# Patient Record
Sex: MALE | Race: WHITE | NOT HISPANIC OR LATINO | Employment: FULL TIME | URBAN - METROPOLITAN AREA
[De-identification: names, ages, dates, MRNs, and addresses within clinical notes are randomized per-mention and may not be internally consistent; named-entity substitution may affect disease eponyms.]

---

## 2020-09-17 ENCOUNTER — APPOINTMENT (OUTPATIENT)
Dept: GENERAL RADIOLOGY | Facility: HOSPITAL | Age: 35
End: 2020-09-17

## 2020-09-17 ENCOUNTER — HOSPITAL ENCOUNTER (EMERGENCY)
Facility: HOSPITAL | Age: 35
Discharge: HOME | End: 2020-09-17
Admitting: EMERGENCY MEDICINE

## 2020-09-17 VITALS
HEART RATE: 69 BPM | DIASTOLIC BLOOD PRESSURE: 75 MMHG | TEMPERATURE: 98 F | OXYGEN SATURATION: 100 % | SYSTOLIC BLOOD PRESSURE: 116 MMHG | RESPIRATION RATE: 16 BRPM | BODY MASS INDEX: 23.95 KG/M2 | WEIGHT: 176.81 LBS | HEIGHT: 72 IN

## 2020-09-17 DIAGNOSIS — S61.012A LACERATION OF LEFT THUMB WITHOUT FOREIGN BODY WITHOUT DAMAGE TO NAIL, INITIAL ENCOUNTER: Primary | ICD-10-CM

## 2020-09-17 DIAGNOSIS — S60.419A ABRASION OF FINGER, INITIAL ENCOUNTER: ICD-10-CM

## 2020-09-17 DIAGNOSIS — S61.325A: ICD-10-CM

## 2020-09-17 PROCEDURE — 25010000003 LIDOCAINE 1 % SOLUTION

## 2020-09-17 PROCEDURE — 73140 X-RAY EXAM OF FINGER(S): CPT

## 2020-09-17 PROCEDURE — 99283 EMERGENCY DEPT VISIT LOW MDM: CPT

## 2020-09-17 RX ORDER — CLINDAMYCIN HYDROCHLORIDE 300 MG/1
300 CAPSULE ORAL 3 TIMES DAILY
Qty: 15 CAPSULE | Refills: 0 | Status: SHIPPED | OUTPATIENT
Start: 2020-09-17 | End: 2020-09-22

## 2020-09-17 RX ORDER — IBUPROFEN 800 MG/1
800 TABLET ORAL EVERY 8 HOURS PRN
Qty: 9 TABLET | Refills: 0 | Status: SHIPPED | OUTPATIENT
Start: 2020-09-17

## 2020-09-17 RX ORDER — HYDROCODONE BITARTRATE AND ACETAMINOPHEN 5; 325 MG/1; MG/1
1 TABLET ORAL ONCE AS NEEDED
Status: DISCONTINUED | OUTPATIENT
Start: 2020-09-17 | End: 2020-09-17 | Stop reason: HOSPADM

## 2020-09-17 RX ADMIN — HYDROCODONE BITARTRATE AND ACETAMINOPHEN 1 TABLET: 5; 325 TABLET ORAL at 20:02

## 2020-09-17 NOTE — ED PROVIDER NOTES
Subjective   Chief complaint: finger laceration      Context: Patient is a 35-year-old male who comes in complaining of finger lacerations to his left hand.  He has a minor laceration noted to the left thumb and fifth finger and states the  mostly hit the fourth finger.  He reports no medical problems is unsure when his last tetanus shot was.  He denies any numbness or tingling and states it is moving normally without any weakness.    Duration: 30 min PTA    Timing: waxes and wanes    Severity: moderate    Associated symptoms: worse with ROM          PCP:             Review of Systems   Constitutional: Negative for fever.   HENT: Negative.    Respiratory: Negative.    Genitourinary: Negative.    Musculoskeletal: Positive for myalgias.   Skin: Positive for wound.   Allergic/Immunologic: Negative for immunocompromised state.   Neurological: Negative for numbness.   Hematological: Does not bruise/bleed easily.   Psychiatric/Behavioral: Negative for confusion.       No past medical history on file.    Allergies   Allergen Reactions   • Ceclor [Cefaclor] Hives   • Sulfa Antibiotics Hives       No past surgical history on file.    No family history on file.    Social History     Socioeconomic History   • Marital status:      Spouse name: Not on file   • Number of children: Not on file   • Years of education: Not on file   • Highest education level: Not on file           Objective   Physical Exam     Vital signs and traige nurse note reviewed.  Constitutional:  Awake, alert; well developed and well nourished.  No acute distress,  HEENT:  Normocephalic, atraumatic;  with intact EOM; oropharynx is pink and moist without edema or erythema.  Neck: Supple, full range of motion without pain;   Cardiovascular: Regular rate and rhythm,    Pulmonary: Respiratory effort regular, nonlabored;   Musculoskeletal: Patient's hands are blackened bilaterally from work.  There is a superficial nonbleeding 1 cm laceration noted  to the dorsum of the left thumb distal phalanx without nailbed injury.  There is a superficial laceration noted to the distal phalanx of the tip and palmar aspect of the left fifth finger, there is good FDS and FDP strength with flexion extension distal resting sensorimotor intact.  There is a approximate 2-1/2 cm laceration extending from the left fourth finger distal phalanx across the nailbed medially to the palmar aspect of the finger.  There is no underlying swelling with a scant amount of active bleeding.  Good strength with flexion extension.  Distal resting sensorimotor intact.  Neuro: Alert oriented x3, speech is clear and appropriate.      Laceration Repair    Date/Time: 9/17/2020 8:40 PM  Performed by: Bushra Galvez APRN  Authorized by: Bushra Galvez APRN     Consent:     Consent obtained:  Verbal    Consent given by:  Patient    Risks discussed:  Infection, need for additional repair, pain, retained foreign body, tendon damage, vascular damage, poor wound healing, poor cosmetic result and nerve damage    Alternatives discussed:  No treatment, delayed treatment, observation and referral  Anesthesia (see MAR for exact dosages):     Anesthesia method:  Nerve block    Block needle gauge:  30 G    Block anesthetic:  Bupivacaine 0.5% w/o epi and lidocaine 1% w/o epi    Block injection procedure:  Anatomic landmarks identified, introduced needle, incremental injection, negative aspiration for blood and anatomic landmarks palpated    Block outcome:  Anesthesia achieved  Laceration details:     Location:  Finger    Finger location:  L ring finger  Pre-procedure details:     Preparation:  Patient was prepped and draped in usual sterile fashion and imaging obtained to evaluate for foreign bodies  Exploration:     Wound exploration: wound explored through full range of motion and entire depth of wound probed and visualized      Wound extent: foreign bodies/material      Contaminated: yes    Treatment:      Area cleansed with:  Hibiclens and saline    Amount of cleaning:  Extensive    Irrigation solution:  Sterile saline    Visualized foreign bodies/material removed: yes    Skin repair:     Repair method:  Sutures    Suture size:  5-0    Suture material:  Nylon    Number of sutures:  3  Approximation:     Approximation:  Close  Post-procedure details:     Patient tolerance of procedure:  Tolerated well, no immediate complications  Comments:      Steri-Strips were applied to the left fifth finger abrasion.  No wound repair but cleansed of the fourth finger.  There was no wound repair to the laceration of the thumb               ED Course           Labs Reviewed - No data to display  Medications   Tdap (BOOSTRIX) injection 0.5 mL (0.5 mL Intramuscular Not Given 9/17/20 2003)   HYDROcodone-acetaminophen (NORCO) 5-325 MG per tablet 1 tablet (1 tablet Oral Given 9/17/20 2002)     Xr Finger 2+ View Left    Result Date: 9/17/2020  1.Evidence for soft tissue trauma involving the distal aspect of the fourth and fifth digits. There is evidence for multiple retained radiopaque foreign bodies within the areas of soft tissue laceration or trauma. 2.No displaced fracture or dislocation.  Electronically Signed By-Jesus Alberto Meneses On:9/17/2020 8:16 PM This report was finalized on [id number] by  Jesus Alberto Meneses, .                                    MDM  Number of Diagnoses or Management Options  Abrasion of finger, initial encounter:   Laceration of left ring finger with foreign body and damage to nail, initial encounter:   Laceration of left thumb without foreign body without damage to nail, initial encounter:   Diagnosis management comments:   Comorbidities:  has no past medical history on file.  Differentials: Fracture foreign body retention not all inclusive of differentials considered  Radiology interpretation:  X-rays reviewed by me and interpreted by radiologist,   Xr Finger 2+ View Left    Result Date: 9/17/2020  1.Evidence for  soft tissue trauma involving the distal aspect of the fourth and fifth digits. There is evidence for multiple retained radiopaque foreign bodies within the areas of soft tissue laceration or trauma. 2.No displaced fracture or dislocation.  Electronically Signed By-Jesus Alberto Meneses On:9/17/2020 8:16 PM This report was finalized on [id number] by  Jesus Alberto Meneses, .    Appropriate PPE worn during exam.    i discussed findings with patient who voices understanding of discharge instructions, signs and symptoms requiring return to ED; discharged improved and in stable condition with follow up for re-evaluation.      Patient was updated on tetanus and x-rays were obtained.  He was given a Norco.  We discussed the foreign bodies on his x-ray and importance of follow-up as he may need debridement or nailbed repair and he was agreeable and verbalized understanding.  He refused suture repair of the left fourth finger laceration between the DIP and PIP.      Final diagnoses:   Laceration of left thumb without foreign body without damage to nail, initial encounter   Laceration of left ring finger with foreign body and damage to nail, initial encounter   Abrasion of finger, initial encounter            Bushra Galvez, APRN  09/17/20 1827

## 2022-03-10 ENCOUNTER — TRANSCRIBE ORDERS (OUTPATIENT)
Dept: ADMINISTRATIVE | Facility: HOSPITAL | Age: 37
End: 2022-03-10

## 2022-03-10 ENCOUNTER — LAB (OUTPATIENT)
Dept: LAB | Facility: HOSPITAL | Age: 37
End: 2022-03-10

## 2022-03-10 DIAGNOSIS — E78.5 HYPERLIPIDEMIA, UNSPECIFIED HYPERLIPIDEMIA TYPE: ICD-10-CM

## 2022-03-10 DIAGNOSIS — E55.9 VITAMIN D DEFICIENCY: ICD-10-CM

## 2022-03-10 DIAGNOSIS — Z12.5 SPECIAL SCREENING FOR MALIGNANT NEOPLASM OF PROSTATE: ICD-10-CM

## 2022-03-10 DIAGNOSIS — E61.1 IRON DEFICIENCY: ICD-10-CM

## 2022-03-10 DIAGNOSIS — R21 SKIN RASH: ICD-10-CM

## 2022-03-10 DIAGNOSIS — E03.9 PRIMARY HYPOTHYROIDISM: ICD-10-CM

## 2022-03-10 DIAGNOSIS — F41.9 ANXIETY: ICD-10-CM

## 2022-03-10 DIAGNOSIS — R53.83 OTHER FATIGUE: ICD-10-CM

## 2022-03-10 DIAGNOSIS — R53.83 OTHER FATIGUE: Primary | ICD-10-CM

## 2022-03-10 LAB
25(OH)D3 SERPL-MCNC: 33.5 NG/ML (ref 30–100)
ALBUMIN SERPL-MCNC: 4.7 G/DL (ref 3.5–5.2)
ALBUMIN/GLOB SERPL: 1.6 G/DL
ALP SERPL-CCNC: 58 U/L (ref 39–117)
ALT SERPL W P-5'-P-CCNC: 35 U/L (ref 1–41)
ANION GAP SERPL CALCULATED.3IONS-SCNC: 7 MMOL/L (ref 5–15)
AST SERPL-CCNC: 26 U/L (ref 1–40)
BILIRUB SERPL-MCNC: 0.3 MG/DL (ref 0–1.2)
BUN SERPL-MCNC: 21 MG/DL (ref 6–20)
BUN/CREAT SERPL: 22.3 (ref 7–25)
CALCIUM SPEC-SCNC: 10 MG/DL (ref 8.6–10.5)
CHLORIDE SERPL-SCNC: 104 MMOL/L (ref 98–107)
CO2 SERPL-SCNC: 27 MMOL/L (ref 22–29)
CREAT SERPL-MCNC: 0.94 MG/DL (ref 0.76–1.27)
CRP SERPL-MCNC: 0.06 MG/DL (ref 0.01–0.5)
DEPRECATED RDW RBC AUTO: 39 FL (ref 37–54)
EGFRCR SERPLBLD CKD-EPI 2021: 109.1 ML/MIN/1.73
ERYTHROCYTE [DISTWIDTH] IN BLOOD BY AUTOMATED COUNT: 12.5 % (ref 12.3–15.4)
FERRITIN SERPL-MCNC: 260 NG/ML (ref 30–400)
GLOBULIN UR ELPH-MCNC: 2.9 GM/DL
GLUCOSE SERPL-MCNC: 105 MG/DL (ref 65–99)
HCT VFR BLD AUTO: 51.1 % (ref 37.5–51)
HGB BLD-MCNC: 17.3 G/DL (ref 13–17.7)
MCH RBC QN AUTO: 29.4 PG (ref 26.6–33)
MCHC RBC AUTO-ENTMCNC: 33.9 G/DL (ref 31.5–35.7)
MCV RBC AUTO: 86.8 FL (ref 79–97)
PLATELET # BLD AUTO: 195 10*3/MM3 (ref 140–450)
PMV BLD AUTO: 10.6 FL (ref 6–12)
POTASSIUM SERPL-SCNC: 4.7 MMOL/L (ref 3.5–5.2)
PROT SERPL-MCNC: 7.6 G/DL (ref 6–8.5)
PSA SERPL-MCNC: 0.77 NG/ML (ref 0–4)
RBC # BLD AUTO: 5.89 10*6/MM3 (ref 4.14–5.8)
SODIUM SERPL-SCNC: 138 MMOL/L (ref 136–145)
T3FREE SERPL-MCNC: 3.52 PG/ML (ref 2–4.4)
T4 FREE SERPL-MCNC: 1.3 NG/DL (ref 0.93–1.7)
TESTOST SERPL-MCNC: 384 NG/DL (ref 249–836)
TSH SERPL DL<=0.05 MIU/L-ACNC: 1.08 UIU/ML (ref 0.27–4.2)
WBC NRBC COR # BLD: 6.41 10*3/MM3 (ref 3.4–10.8)

## 2022-03-10 PROCEDURE — 84443 ASSAY THYROID STIM HORMONE: CPT

## 2022-03-10 PROCEDURE — 84481 FREE ASSAY (FT-3): CPT

## 2022-03-10 PROCEDURE — 86141 C-REACTIVE PROTEIN HS: CPT

## 2022-03-10 PROCEDURE — 84439 ASSAY OF FREE THYROXINE: CPT

## 2022-03-10 PROCEDURE — 80053 COMPREHEN METABOLIC PANEL: CPT

## 2022-03-10 PROCEDURE — G0103 PSA SCREENING: HCPCS

## 2022-03-10 PROCEDURE — 84403 ASSAY OF TOTAL TESTOSTERONE: CPT

## 2022-03-10 PROCEDURE — 85027 COMPLETE CBC AUTOMATED: CPT

## 2022-03-10 PROCEDURE — 83525 ASSAY OF INSULIN: CPT

## 2022-03-10 PROCEDURE — 82306 VITAMIN D 25 HYDROXY: CPT

## 2022-03-10 PROCEDURE — 86628 CANDIDA ANTIBODY: CPT

## 2022-03-10 PROCEDURE — 82728 ASSAY OF FERRITIN: CPT

## 2022-03-10 PROCEDURE — 36415 COLL VENOUS BLD VENIPUNCTURE: CPT

## 2022-03-11 LAB — INSULIN SERPL-ACNC: 10.2 UIU/ML (ref 2.6–24.9)

## 2024-12-22 NOTE — PROGRESS NOTES
Encounter Date:12/31/2024      Patient ID: Ernie Stuart is a 39 y.o. male.    Chief Complaint:  Chest discomfort  Incomplete right bundle branch block    History of Present Illness  The patient is a pleasant 39-year-old white male is here for evaluation of chest discomfort.  Patient was having symptoms of substernal discomfort sometimes with eating.  Symptoms have somewhat improved with taking medications.  EKG showed incomplete right bundle branch block.  The chest discomfort is described as substernal aching sensation.  Not necessarily exertional.  Nonradiating.    Patient is not having any shortness of breath, palpitations, dizziness or syncope.  Denies having any headache ,abdominal pain ,nausea, vomiting , diarrhea constipation, loss of weight or loss of appetite.  Denies having any excessive bruising ,hematuria or blood in the stool.    Review of all systems negative except as indicated.    Reviewed ROS.    Assessment and Plan       ]]]]]]]]]]]]]]]]]]]  Impression  =========  -Chest discomfort-atypical for angina pectoris.    - History of exertional palpitations    - Possible GERD    -Incomplete right bundle branch block-EKG from 12/5/2024    - 12 family history of coronary artery disease    - Allergic to Ceclor and sulfa    - Non-smoker        Chest discomfort-atypical.  EKG showed incomplete right bundle branch block.  Complaints could be from GERD.  Patient has exertional palpitations.    Patient has strong family history of coronary artery disease.  Stress Cardiolite test  Echocardiogram    Medications were reviewed and updated.    Follow-up in the office on the same day or soon after.    Further plan will depend on patient's progress.  [[[[[[[[[[[[[[[[[           Diagnosis Plan   1. Chest discomfort  Adult Transthoracic Echo Complete W/ Cont if Necessary Per Protocol    Stress Test With Myocardial Perfusion One Day      2. Family history of coronary artery disease  Adult Transthoracic Echo Complete W/  Cont if Necessary Per Protocol    Stress Test With Myocardial Perfusion One Day      3. Incomplete right bundle branch block  Adult Transthoracic Echo Complete W/ Cont if Necessary Per Protocol    Stress Test With Myocardial Perfusion One Day      4. Chest pain due to GERD  Adult Transthoracic Echo Complete W/ Cont if Necessary Per Protocol    Stress Test With Myocardial Perfusion One Day      LAB RESULTS (LAST 7 DAYS)    CBC        BMP        CMP         BNP        TROPONIN        CoAg        Creatinine Clearance  CrCl cannot be calculated (Patient's most recent lab result is older than the maximum 30 days allowed.).    ABG        Radiology  No radiology results for the last day                The following portions of the patient's history were reviewed and updated as appropriate: allergies, current medications, past family history, past medical history, past social history, past surgical history, and problem list.    Review of Systems   Constitutional: Negative for malaise/fatigue.   Cardiovascular:  Positive for palpitations. Negative for chest pain, dyspnea on exertion and leg swelling.   Respiratory:  Negative for cough and shortness of breath.    Gastrointestinal:  Negative for abdominal pain, nausea and vomiting.   Neurological:  Negative for dizziness, focal weakness, headaches, light-headedness and numbness.   All other systems reviewed and are negative.        Current Outpatient Medications:     ibuprofen (ADVIL,MOTRIN) 800 MG tablet, Take 1 tablet by mouth Every 8 (Eight) Hours As Needed for Moderate Pain . (Patient not taking: Reported on 12/31/2024), Disp: 9 tablet, Rfl: 0    Allergies   Allergen Reactions    Ceclor [Cefaclor] Hives    Sulfa Antibiotics Hives       Family History   Problem Relation Age of Onset    Heart disease Father     Diabetes Maternal Grandmother     Brain cancer Maternal Grandfather     Heart disease Paternal Grandfather        History reviewed. No pertinent surgical  "history.    Past Medical History:   Diagnosis Date    Abnormal ECG        Family History   Problem Relation Age of Onset    Heart disease Father     Diabetes Maternal Grandmother     Brain cancer Maternal Grandfather     Heart disease Paternal Grandfather        Social History     Socioeconomic History    Marital status:    Tobacco Use    Smoking status: Never     Passive exposure: Never    Smokeless tobacco: Never   Vaping Use    Vaping status: Never Used   Substance and Sexual Activity    Alcohol use: Defer    Drug use: Never    Sexual activity: Defer         Procedures      Objective:       Physical Exam    /74 (BP Location: Right arm, Patient Position: Sitting, Cuff Size: Adult)   Pulse 72   Ht 182.9 cm (72\")   Wt 87.1 kg (192 lb)   SpO2 97%   BMI 26.04 kg/m²   The patient is alert, oriented and in no distress.    Vital signs as noted above.    Head and neck revealed no carotid bruits or jugular venous distension.  No thyromegaly or lymphadenopathy is present.    Lungs clear.  No wheezing.  Breath sounds are normal bilaterally.    Heart normal first and second heart sounds.  No murmur..  No pericardial rub is present.  No gallop is present.    Abdomen soft and nontender.  No organomegaly is present.    Extremities revealed good peripheral pulses without any pedal edema.    Skin warm and dry.    Musculoskeletal system is grossly normal.    CNS grossly normal.    Reviewed and updated.          "

## 2024-12-31 ENCOUNTER — OFFICE VISIT (OUTPATIENT)
Dept: CARDIOLOGY | Facility: CLINIC | Age: 39
End: 2024-12-31
Payer: COMMERCIAL

## 2024-12-31 VITALS
BODY MASS INDEX: 26.01 KG/M2 | SYSTOLIC BLOOD PRESSURE: 130 MMHG | OXYGEN SATURATION: 97 % | HEART RATE: 72 BPM | DIASTOLIC BLOOD PRESSURE: 74 MMHG | WEIGHT: 192 LBS | HEIGHT: 72 IN

## 2024-12-31 DIAGNOSIS — K21.9 CHEST PAIN DUE TO GERD: ICD-10-CM

## 2024-12-31 DIAGNOSIS — R07.9 CHEST PAIN DUE TO GERD: ICD-10-CM

## 2024-12-31 DIAGNOSIS — R07.89 CHEST DISCOMFORT: Primary | ICD-10-CM

## 2024-12-31 DIAGNOSIS — Z82.49 FAMILY HISTORY OF CORONARY ARTERY DISEASE: ICD-10-CM

## 2024-12-31 DIAGNOSIS — I45.10 INCOMPLETE RIGHT BUNDLE BRANCH BLOCK: ICD-10-CM

## 2024-12-31 PROCEDURE — 99204 OFFICE O/P NEW MOD 45 MIN: CPT | Performed by: INTERNAL MEDICINE

## 2025-01-16 ENCOUNTER — TELEPHONE (OUTPATIENT)
Dept: CARDIOLOGY | Facility: CLINIC | Age: 40
End: 2025-01-16
Payer: COMMERCIAL

## 2025-01-16 NOTE — TELEPHONE ENCOUNTER
Caller: Ernie Stuart    Relationship: Self    Best call back number: [phone]      PATIENT IS NEEDING TO CANCEL ECHO AND STRESS TEST FOR NOW.

## 2025-03-07 ENCOUNTER — HOSPITAL ENCOUNTER (EMERGENCY)
Facility: HOSPITAL | Age: 40
Discharge: HOME | End: 2025-03-08
Attending: EMERGENCY MEDICINE
Payer: COMMERCIAL

## 2025-03-07 DIAGNOSIS — T15.91XA FOREIGN BODY OF RIGHT EYE, INITIAL ENCOUNTER: Primary | ICD-10-CM

## 2025-03-07 PROCEDURE — 99283 EMERGENCY DEPT VISIT LOW MDM: CPT

## 2025-03-07 PROCEDURE — 99282 EMERGENCY DEPT VISIT SF MDM: CPT

## 2025-03-07 RX ORDER — BENOXINATE HCL/FLUORESCEIN SOD 0.4%-0.25%
1 DROPS OPHTHALMIC (EYE) ONCE
Status: COMPLETED | OUTPATIENT
Start: 2025-03-07 | End: 2025-03-07

## 2025-03-07 RX ADMIN — FLUORESCEIN SODIUM AND BENOXINATE HYDROCHLORIDE 1 DROP: 2.5; 4 SOLUTION OPHTHALMIC at 23:51

## 2025-03-08 VITALS
HEART RATE: 65 BPM | OXYGEN SATURATION: 100 % | RESPIRATION RATE: 17 BRPM | DIASTOLIC BLOOD PRESSURE: 80 MMHG | WEIGHT: 189.38 LBS | BODY MASS INDEX: 25.65 KG/M2 | SYSTOLIC BLOOD PRESSURE: 132 MMHG | HEIGHT: 72 IN | TEMPERATURE: 97.9 F

## 2025-03-08 RX ORDER — ERYTHROMYCIN 5 MG/G
OINTMENT OPHTHALMIC
Qty: 3.5 G | Refills: 0 | Status: SHIPPED | OUTPATIENT
Start: 2025-03-08 | End: 2025-03-13

## 2025-03-08 NOTE — ED PROVIDER NOTES
Subjective   History of Present Illness  38y/o M Patient presents with irritation in the eye after feeling like something got into it while working yesterday.  Was working with metal. Initially, the irritation was mild, but it has worsened over the past few hours. The patient does not wear corrective lenses or contacts. The suspected foreign body is likely a small speck of metal per patient.  Review of Systems  See HPI  Past Medical History:   Diagnosis Date    Abnormal ECG        Allergies   Allergen Reactions    Ceclor [Cefaclor] Hives    Sulfa Antibiotics Hives       History reviewed. No pertinent surgical history.    Family History   Problem Relation Age of Onset    Heart disease Father     Diabetes Maternal Grandmother     Brain cancer Maternal Grandfather     Heart disease Paternal Grandfather        Social History     Socioeconomic History    Marital status:    Tobacco Use    Smoking status: Never     Passive exposure: Never    Smokeless tobacco: Never   Vaping Use    Vaping status: Never Used   Substance and Sexual Activity    Alcohol use: Defer    Drug use: Never    Sexual activity: Defer           Objective   Physical Exam  Eyes: PERRL. EOMI.  Conjugate gaze. Conjunctivae are pink.  Mild conjunctival injection in R eye. Normal sclera.  Local anesthesia was. Fluorescein dye was used. The lid was everted. Slight fluorescein uptake.  No corneal abrasions.  No corneal ulcerations.  One punctate foreign body with rust ring was found.  Negative Sandro sign.  Negative skating rink sign.  No acute distress, regular rate and rhythm  IOP 14 in right eye.  Visual acuity 20/70 left eye 20/50 right eye and 20/30 when combined (this visual acuity was after the foreign body removal)  Foreign Body Removal - Ocular    Date/Time: 3/8/2025 12:23 AM    Performed by: Chad Pickett MD  Authorized by: Chad Pickett MD    Consent:     Consent obtained:  Verbal    Consent given by:  Patient    Risks, benefits, and  "alternatives were discussed: yes      Risks discussed:  Bleeding, globe perforation, corneal damage, damage to surrounding structures, visual impairment, worsening of condition, incomplete removal, pain and infection  Universal protocol:     Patient identity confirmed:  Verbally with patient  Location:     Location:  R corneal    Depth:  Superficial  Pre-procedure details:     Imaging:  None    Fluorescein exam: yes      Fluorescein uptake: yes      Sandro test: negative    Anesthesia:     Local anesthetic: Benoxinate.  Procedure details:     Localization method:  Direct visualization    Removal mechanism:  Sterile cotton-tip applicator (israel brush)    Foreign bodies recovered:  1    Intact foreign body removal: yes      Residual rust ring observed: yes      Residual rust ring removed: no    Post-procedure details:     Confirmation:  No additional foreign bodies on visualization             ED Course        /80   Pulse 65   Temp 97.9 °F (36.6 °C)   Resp 17   Ht 182.9 cm (72\")   Wt 85.9 kg (189 lb 6 oz)   SpO2 100%   BMI 25.68 kg/m²   Labs Reviewed - No data to display  Medications   fluorescein-benoxinate (FLURATE) 0.25-0.4 % ophthalmic solution 1 drop (1 drop Both Eyes Given 3/7/25 5487)     No orders to display                                                    Medical Decision Making  Problems Addressed:  Foreign body of right eye, initial encounter: complicated acute illness or injury    Risk  Prescription drug management.    She do not see foreign body on repeat slit-lamp exam, but still has residual rust ring.  Advise close follow-up with ophthalmology.  Erythromycin ointment ordered.  DC.  Return to ER precautions discussed    Final diagnoses:   Foreign body of right eye, initial encounter       ED Disposition  ED Disposition       ED Disposition   Discharge    Condition   Stable    Comment   --               [facility] - [address]  [facility] " [address] [phone]  In 2 days  Call for appointment in AM or Monday         Medication List        New Prescriptions      erythromycin 5 MG/GM ophthalmic ointment  Commonly known as: ROMYCIN  Administer  to the right eye Every 4 (Four) Hours While Awake for 5 days.               Where to Get Your Medications        These medications were sent to [id number] - [place], [place] - [address] AT [facility] [address] [facility] [address] - [phone]  - [phone] FX  [address] [place] [address]      Phone: [phone]   erythromycin 5 MG/GM ophthalmic ointment            Chad Pickett MD  03/08/25 0798

## 2025-06-03 ENCOUNTER — OFFICE (OUTPATIENT)
Dept: URBAN - METROPOLITAN AREA CLINIC 64 | Facility: CLINIC | Age: 40
End: 2025-06-03
Payer: COMMERCIAL

## 2025-06-03 VITALS
WEIGHT: 184 LBS | SYSTOLIC BLOOD PRESSURE: 129 MMHG | HEIGHT: 72 IN | HEART RATE: 81 BPM | DIASTOLIC BLOOD PRESSURE: 81 MMHG

## 2025-06-03 DIAGNOSIS — R13.10 DYSPHAGIA, UNSPECIFIED: ICD-10-CM

## 2025-06-03 PROCEDURE — 99203 OFFICE O/P NEW LOW 30 MIN: CPT | Performed by: INTERNAL MEDICINE
